# Patient Record
Sex: MALE | Race: WHITE | NOT HISPANIC OR LATINO | Employment: OTHER | URBAN - METROPOLITAN AREA
[De-identification: names, ages, dates, MRNs, and addresses within clinical notes are randomized per-mention and may not be internally consistent; named-entity substitution may affect disease eponyms.]

---

## 2024-03-21 ENCOUNTER — HOSPITAL ENCOUNTER (OUTPATIENT)
Dept: RADIOLOGY | Facility: CLINIC | Age: 59
Discharge: HOME | End: 2024-03-21
Payer: COMMERCIAL

## 2024-03-21 DIAGNOSIS — F17.210 NICOTINE DEPENDENCE, CIGARETTES, UNCOMPLICATED: ICD-10-CM

## 2024-03-21 PROCEDURE — 71271 CT THORAX LUNG CANCER SCR C-: CPT

## 2024-04-02 ENCOUNTER — TELEPHONE (OUTPATIENT)
Dept: CARDIOLOGY | Facility: CLINIC | Age: 59
End: 2024-04-02
Payer: COMMERCIAL

## 2024-04-02 NOTE — TELEPHONE ENCOUNTER
Talked with the Patient on 4/2/24 he will be a New Patient with Dr. Samson his appointment is 4/11/24 at 3pm I spoke with the patient on the phone

## 2024-04-05 PROBLEM — I10 BENIGN ESSENTIAL HYPERTENSION: Status: ACTIVE | Noted: 2021-02-26

## 2024-04-05 PROBLEM — E78.5 DYSLIPIDEMIA: Status: ACTIVE | Noted: 2019-05-23

## 2024-04-05 PROBLEM — J44.9 CHRONIC OBSTRUCTIVE LUNG DISEASE (MULTI): Status: ACTIVE | Noted: 2021-02-26

## 2024-04-05 RX ORDER — IBUPROFEN 600 MG/1
TABLET ORAL EVERY 8 HOURS
COMMUNITY
Start: 2024-03-28

## 2024-04-05 RX ORDER — FLUTICASONE FUROATE, UMECLIDINIUM BROMIDE AND VILANTEROL TRIFENATATE 200; 62.5; 25 UG/1; UG/1; UG/1
1 POWDER RESPIRATORY (INHALATION)
COMMUNITY
Start: 2024-03-28

## 2024-04-05 RX ORDER — LEVALBUTEROL TARTRATE 45 UG/1
AEROSOL, METERED ORAL
COMMUNITY
End: 2024-04-11 | Stop reason: WASHOUT

## 2024-04-05 RX ORDER — ATORVASTATIN CALCIUM 80 MG/1
1 TABLET, FILM COATED ORAL DAILY
COMMUNITY
Start: 2024-03-28

## 2024-04-05 RX ORDER — NAPROXEN 500 MG/1
TABLET ORAL
COMMUNITY
Start: 2008-10-07 | End: 2024-04-11 | Stop reason: WASHOUT

## 2024-04-05 RX ORDER — AMLODIPINE BESYLATE 5 MG/1
1 TABLET ORAL DAILY
COMMUNITY
Start: 2024-03-28

## 2024-04-05 RX ORDER — IPRATROPIUM BROMIDE AND ALBUTEROL 20; 100 UG/1; UG/1
SPRAY, METERED RESPIRATORY (INHALATION)
COMMUNITY
End: 2024-04-11 | Stop reason: WASHOUT

## 2024-04-05 RX ORDER — ONDANSETRON 4 MG/1
TABLET, FILM COATED ORAL
COMMUNITY

## 2024-04-05 RX ORDER — DOCUSATE SODIUM 100 MG/1
1 CAPSULE, LIQUID FILLED ORAL
COMMUNITY
End: 2024-04-11 | Stop reason: WASHOUT

## 2024-04-05 RX ORDER — GABAPENTIN 300 MG/1
300 CAPSULE ORAL DAILY
COMMUNITY
Start: 2018-06-12 | End: 2024-04-11 | Stop reason: WASHOUT

## 2024-04-05 RX ORDER — SIMVASTATIN 40 MG/1
TABLET, FILM COATED ORAL
COMMUNITY
Start: 2009-01-23 | End: 2024-04-11 | Stop reason: WASHOUT

## 2024-04-05 RX ORDER — LISINOPRIL 5 MG/1
5 TABLET ORAL DAILY
COMMUNITY
Start: 2023-09-09 | End: 2024-04-11 | Stop reason: WASHOUT

## 2024-04-05 RX ORDER — LISINOPRIL 10 MG/1
1 TABLET ORAL DAILY
COMMUNITY
Start: 2024-03-28

## 2024-04-05 RX ORDER — NALOXONE HYDROCHLORIDE 4 MG/.1ML
SPRAY NASAL
COMMUNITY
Start: 2023-11-14 | End: 2024-04-11 | Stop reason: WASHOUT

## 2024-04-05 RX ORDER — LIDOCAINE HCL 4 G/100G
CREAM TOPICAL
COMMUNITY
Start: 2017-02-17 | End: 2024-04-11 | Stop reason: WASHOUT

## 2024-04-05 RX ORDER — ATORVASTATIN CALCIUM 40 MG/1
40 TABLET, FILM COATED ORAL DAILY
COMMUNITY
End: 2024-04-11 | Stop reason: WASHOUT

## 2024-04-05 RX ORDER — ALBUTEROL SULFATE 90 UG/1
2 AEROSOL, METERED RESPIRATORY (INHALATION) EVERY 4 HOURS PRN
COMMUNITY

## 2024-04-05 RX ORDER — OXYCODONE AND ACETAMINOPHEN 7.5; 325 MG/1; MG/1
TABLET ORAL
COMMUNITY

## 2024-04-05 RX ORDER — PROMETHAZINE HYDROCHLORIDE 25 MG/1
25 TABLET ORAL
COMMUNITY
Start: 2018-01-15 | End: 2024-04-11 | Stop reason: WASHOUT

## 2024-04-05 RX ORDER — CYCLOBENZAPRINE HCL 5 MG
TABLET ORAL
COMMUNITY
End: 2024-04-11 | Stop reason: WASHOUT

## 2024-04-05 RX ORDER — SERTRALINE HYDROCHLORIDE 25 MG/1
100 TABLET, FILM COATED ORAL DAILY
COMMUNITY
End: 2024-04-11 | Stop reason: WASHOUT

## 2024-04-05 RX ORDER — QUETIAPINE FUMARATE 50 MG/1
50 TABLET, FILM COATED ORAL DAILY
COMMUNITY
End: 2024-04-11 | Stop reason: WASHOUT

## 2024-04-09 PROBLEM — F17.200 NICOTINE DEPENDENCE: Status: ACTIVE | Noted: 2024-04-09

## 2024-04-10 RX ORDER — ASPIRIN 81 MG/1
81 TABLET ORAL DAILY
COMMUNITY

## 2024-04-10 RX ORDER — BISMUTH SUBSALICYLATE 262 MG
1 TABLET,CHEWABLE ORAL DAILY
COMMUNITY

## 2024-04-11 ENCOUNTER — OFFICE VISIT (OUTPATIENT)
Dept: CARDIOLOGY | Facility: CLINIC | Age: 59
End: 2024-04-11
Payer: COMMERCIAL

## 2024-04-11 VITALS
SYSTOLIC BLOOD PRESSURE: 146 MMHG | WEIGHT: 213.6 LBS | BODY MASS INDEX: 29.9 KG/M2 | DIASTOLIC BLOOD PRESSURE: 84 MMHG | HEART RATE: 92 BPM | OXYGEN SATURATION: 96 % | HEIGHT: 71 IN

## 2024-04-11 DIAGNOSIS — F17.200 SMOKING: ICD-10-CM

## 2024-04-11 DIAGNOSIS — I25.84 CORONARY ARTERY CALCIFICATION: Primary | ICD-10-CM

## 2024-04-11 DIAGNOSIS — I25.10 CORONARY ARTERY CALCIFICATION: Primary | ICD-10-CM

## 2024-04-11 DIAGNOSIS — I10 BENIGN ESSENTIAL HYPERTENSION: ICD-10-CM

## 2024-04-11 DIAGNOSIS — E78.5 DYSLIPIDEMIA: ICD-10-CM

## 2024-04-11 PROCEDURE — 3079F DIAST BP 80-89 MM HG: CPT | Performed by: STUDENT IN AN ORGANIZED HEALTH CARE EDUCATION/TRAINING PROGRAM

## 2024-04-11 PROCEDURE — 99205 OFFICE O/P NEW HI 60 MIN: CPT | Performed by: STUDENT IN AN ORGANIZED HEALTH CARE EDUCATION/TRAINING PROGRAM

## 2024-04-11 PROCEDURE — 93000 ELECTROCARDIOGRAM COMPLETE: CPT | Performed by: STUDENT IN AN ORGANIZED HEALTH CARE EDUCATION/TRAINING PROGRAM

## 2024-04-11 PROCEDURE — 3077F SYST BP >= 140 MM HG: CPT | Performed by: STUDENT IN AN ORGANIZED HEALTH CARE EDUCATION/TRAINING PROGRAM

## 2024-04-11 NOTE — PROGRESS NOTES
[facility] and Vascular Cardiology Clinic Note    Date: 04/11/24  Time: 9:27 PM    Subjective   Linwood King IV is a 58 y.o. male who is coming to cardiology clinic to establish care. Patient has h/o   HTN, COPD, Smoking, HLD who is referred to cardiology due to abnormal coronary calcification noted on CT scan. This was found to routine screening for lung cancer/LDCT. Patient has no chest pain/angina. Has chronic dyspnea from COPD and continue to smoke. He denies any functional limitation.       Cardiovascular risk factors include: advanced age (older than 55 for men, 65 for women), dyslipidemia, hypertension, male gender, and smoking/ tobacco exposure.    Denies chest pain, has chronic dyspnea bcz of COPD  Smoking- yes, smokes half pack day for 40 yrs   ETOH- denies  Illicit Drugs-denies  FH- CHF    The ASCVD Risk score (Shruthi HANKS, et al., 2019) failed to calculate for the following reasons:    Cannot find a previous HDL lab    Cannot find a previous total cholesterol lab      Review of Systems:  Otherwise, limited cardiovascular review of systems is negative.        Medical History:   He has no past medical history on file.  Surgical History:   History reviewed. No pertinent surgical history.PSHP@  Social History:   Social Determinants of Health with Concerns     Tobacco Use: High Risk (4/11/2024)    Patient History     Smoking Tobacco Use: Every Day     Smokeless Tobacco Use: Never     Passive Exposure: Not on file   Alcohol Use: Not on file   Transportation Needs: Not on file   Physical Activity: Not on file   Stress: Not on file   Social Connections: Not on file   Intimate Partner Violence: Not on file   Depression: Not on file   Housing Stability: Not on file   Utilities: Not on file   Digital Equity: Not on file   Health Literacy: Not on file     Family History:   No family history on file.   Allergies:  Patient has no known allergies.    Outpatient Medications:  Current Outpatient Medications  "  Medication Instructions    albuterol 90 mcg/actuation inhaler 2 puffs, Every 4 hours PRN    aspirin 81 mg, oral, Daily    COQ10, UBIQUINOL, ORAL oral    ibuprofen 600 mg tablet oral, Every 8 hours    Lipitor 80 mg tablet 1 tablet, oral, Daily    lisinopril 10 mg tablet 1 tablet, oral, Daily    multivitamin tablet 1 tablet, oral, Daily    Norvasc 5 mg tablet 1 tablet, oral, Daily    ondansetron (Zofran) 4 mg tablet 1 tablet Orally every 12 hours as needed for nausea for 30 days    oxyCODONE-acetaminophen (Percocet) 7.5-325 mg tablet TAKE ONE (1) TABLET BY MOUTH EVERY SIX (6) HOURS AS NEEDED FOR PAIN    Trelegy Ellipta 200-62.5-25 mcg blister with device 1 puff, inhalation, Daily RT       Objective     Physical Exam  Vitals:    04/11/24 1511   BP: 146/84   BP Location: Left arm   Patient Position: Sitting   BP Cuff Size: Adult   Pulse: 92   SpO2: 96%   Weight: 96.9 kg (213 lb 9.6 oz)   Height: 1.803 m (5' 11\")     Wt Readings from Last 3 Encounters:   04/11/24 96.9 kg (213 lb 9.6 oz)       General: Alert and Oriented, No distress, cooperative  Head: Normocephalic without obvious abnormality, atraumatic  Eyes: Conjunctiva/corneas clear, EOM's grossly intact  Neck: Supple, trachea midline, No thyroid enlargement/tenderness/nodules; No JVD  Lungs: Clear to auscultation bilaterally, no wheezes, rhonci, or rales. respirations unlabored  Chest Wall: No tenderness or deformity  Heart: Regular rhythm, normal S1/S2, no murmur  Abdomen: Soft, non-tender, Non-distended, bowel sounds active  Extremities: No edema, no cyanosis, no clubbing  Skin: Skin color, texture, turgor normal.  No rashes or lesions noted  Neurologic: Alert and oriented x 3, grossly moving all extremities, speech intact        I have personally reviewed the following images and laboratory findings:  ECG: NSR, IRBB  Echocardiogram: not done  CT LUNG SCREENING LOW DOSE      INDICATION:  Signs/Symptoms:TOBACCO USE.      COMPARISON:  None.      ACCESSION " NUMBER(S):  [id number]      ORDERING CLINICIAN:  WILLIAMS VOGEL      TECHNIQUE:  Helical data acquisition of the chest was obtained without IV  contrast material.  Images were reformatted in axial, coronal, and  sagittal planes.      FINDINGS:  LUNGS AND AIRWAYS:  The trachea and central airways are patent. No endobronchial lesion  is seen. There is no focal consolidation, pleural effusion, or  pneumothorax.      Mild upper lung predominant emphysema noted.      Oval perifissural nodule in the right middle lobe on image 219  measuring 6 mm likely representing intrapulmonary lymph node. There  is a 7 mm right lower lobe perihilar nodule which may also represent  a lymph node.      MEDIASTINUM AND SAM, LOWER NECK AND AXILLA:  The visualized thyroid gland is within normal limits.  No evidence of thoracic lymphadenopathy by CT criteria.  Esophagus appears within normal limits as seen.      HEART AND VESSELS:  The thoracic aorta normal in course and caliber.  Main pulmonary artery and its branches are normal in caliber.      Patient's coronary artery calcification (CAC) score of 187 was  measured using an FDA-cleared AI tool. Study is not optimized for  evaluation of coronary arteries.      The cardiac chambers are not enlarged.  There is no pericardial effusion seen.      UPPER ABDOMEN:  The visualized subdiaphragmatic structures demonstrate no remarkable  findings.      CHEST WALL AND OSSEOUS STRUCTURES:  Chest wall is within normal limits.  No acute osseous pathology. There are no suspicious osseous lesions.  Findings of diffuse idiopathic skeletal hyperostosis(DISH) in the mid  and lower thoracic levels with anterior flowing ossification noted  involving greater than four levels.      IMPRESSION:  1. 7 mm right lower lobe perihilar nodule which may represent lymph  node, probably benign. Recommend six-month low-dose CT chest  follow-up to document stability.  2. Patient's coronary artery calcification (CAC) score of  "187 was  measured using an FDA-cleared AI tool that highly correlates with the  traditional CAC measurements. However, the above estimate may subject  to the limitations of the non-gated CT scan and the novel nature of  the algorithm. Therefore, AI-powered CAC score estimation  should not  be used as a replacement for traditional  cardiovascular risk factor  assessment. If further assistance is required, consider referral to  the [facility] Cardiovascular Prevention Program ( Preventive  Cardiology): Place EPIC referral to 'Cardiology Prevention Program.'  3. Mild upper lung predominant emphysema.  4. Findings of diffuse idiopathic skeletal hyperostosis(DISH) in the  mid and lower thoracic levels          Laboratory values:   No visits with results within 2 Month(s) from this visit.   Latest known visit with results is:   No results found for any previous visit.     CBC -  No results found for: \"WBC\", \"HGB\", \"HCT\", \"MCV\", \"PLT\"    CMP -  No results found for: \"CALCIUM\", \"PHOS\", \"PROT\", \"ALBUMIN\", \"AST\", \"ALT\", \"ALKPHOS\", \"BILITOT\"    LIPID PANEL -   No results found for: \"CHOL\", \"HDL\", \"CHHDL\", \"LDL\", \"VLDL\", \"TRIG\", \"NHDL\"    RENAL FUNCTION PANEL -   No results found for: \"GLU\", \"K\", \"CHLOR\", \"PHOS\"    No results found for: \"BNP\", \"HGBA1C\"     Assessment/Plan   -Abnormal coronary artery calcium score  -HTN  -HLD  -COPD  -Smoking     Plan:  -Discussed regarding risk factor modification and optimizing medical therapy given abnormal CAC score.  -No anginal symptoms at this time.   -Will get TTE to assess for structural hrt diz  -Will start on ASA 81 mg daily  -Continue statin therapy  -encourage smoking cessation.     RTC as scheduled     In addition, the following orders were placed today:  Orders Placed This Encounter   Procedures    ECG 12 Lead    Transthoracic Echo (TTE) Complete                 SIGNATURE: Ken Samson MD PATIENT NAME: Linwood King IV   DATE/TIME: April 11, 2024 9:27 PM MRN: [id number] "

## 2024-05-03 VITALS — HEIGHT: 71 IN | BODY MASS INDEX: 30.1 KG/M2 | WEIGHT: 215 LBS

## 2024-05-03 NOTE — ADDENDUM NOTE
Encounter addended by: Maverick Cooper on: 5/3/2024 9:38 AM   Actions taken: Imaging Exam ended, Flowsheet accepted

## 2024-05-06 ENCOUNTER — APPOINTMENT (OUTPATIENT)
Dept: CARDIOLOGY | Facility: HOSPITAL | Age: 59
End: 2024-05-06
Payer: COMMERCIAL

## 2024-09-23 ENCOUNTER — TELEPHONE (OUTPATIENT)
Dept: CARDIOLOGY | Facility: HOSPITAL | Age: 59
End: 2024-09-23
Payer: COMMERCIAL

## 2024-09-23 NOTE — TELEPHONE ENCOUNTER
Called and LVM to let the pt know that Hermelindo Batista PA marisa be out of town and I rescheduled his appt to 10/21 @1:30pm. I instructed him to call back if the time and date doesn't work.    Scout RAMIRES

## 2024-10-03 PROBLEM — J44.9 CHRONIC OBSTRUCTIVE PULMONARY DISEASE (MULTI): Status: ACTIVE | Noted: 2021-02-26

## 2024-10-03 PROBLEM — I25.10 CALCIFICATION OF CORONARY ARTERY: Status: ACTIVE | Noted: 2024-10-03

## 2024-10-03 RX ORDER — CYCLOBENZAPRINE HCL 5 MG
5 TABLET ORAL
COMMUNITY
Start: 2024-05-17

## 2024-10-14 ENCOUNTER — APPOINTMENT (OUTPATIENT)
Dept: CARDIOLOGY | Facility: CLINIC | Age: 59
End: 2024-10-14
Payer: COMMERCIAL

## 2024-10-21 ENCOUNTER — APPOINTMENT (OUTPATIENT)
Dept: CARDIOLOGY | Facility: HOSPITAL | Age: 59
End: 2024-10-21
Payer: COMMERCIAL

## 2024-10-21 VITALS
DIASTOLIC BLOOD PRESSURE: 66 MMHG | OXYGEN SATURATION: 95 % | WEIGHT: 207 LBS | HEIGHT: 71 IN | HEART RATE: 81 BPM | BODY MASS INDEX: 28.98 KG/M2 | SYSTOLIC BLOOD PRESSURE: 114 MMHG

## 2024-10-21 DIAGNOSIS — R94.31 ABNORMAL EKG: ICD-10-CM

## 2024-10-21 DIAGNOSIS — I10 BENIGN ESSENTIAL HYPERTENSION: Primary | ICD-10-CM

## 2024-10-21 DIAGNOSIS — I25.10 CALCIFICATION OF CORONARY ARTERY: ICD-10-CM

## 2024-10-21 DIAGNOSIS — E78.5 DYSLIPIDEMIA: ICD-10-CM

## 2024-10-21 DIAGNOSIS — I45.10 RBBB: ICD-10-CM

## 2024-10-21 PROCEDURE — 3078F DIAST BP <80 MM HG: CPT | Performed by: PHYSICIAN ASSISTANT

## 2024-10-21 PROCEDURE — 3074F SYST BP LT 130 MM HG: CPT | Performed by: PHYSICIAN ASSISTANT

## 2024-10-21 PROCEDURE — 99213 OFFICE O/P EST LOW 20 MIN: CPT | Performed by: PHYSICIAN ASSISTANT

## 2024-10-21 PROCEDURE — 3008F BODY MASS INDEX DOCD: CPT | Performed by: PHYSICIAN ASSISTANT

## 2024-10-21 ASSESSMENT — ENCOUNTER SYMPTOMS
ORTHOPNEA: 0
SHORTNESS OF BREATH: 1
FEVER: 0
ABDOMINAL PAIN: 0
WEAKNESS: 0
DYSURIA: 0
NAUSEA: 0
VOMITING: 0
WHEEZING: 0
DIARRHEA: 0
PALPITATIONS: 0

## 2024-10-21 NOTE — PATIENT INSTRUCTIONS
We will try to get the echo approved.  Please continue your current medications.  If you have any change in your cardiorespiratory status please call the office right away.  We will arrange for your yearly visit with your cardiologist.

## 2024-10-21 NOTE — PROGRESS NOTES
"Cardiology Follow Up  Chief Complaint:   Patient is here for 6 month office visit.      History Of Present Illness:    Linwood King IV is a 58 y.o. male who is coming to cardiology clinic to establish care. Patient has h/o   HTN, COPD, Smoking, HLD who is referred to cardiology due to abnormal coronary calcification noted on CT scan. This was found to routine screening for lung cancer/LDCT. Patient has no chest pain/angina. Has chronic dyspnea from COPD and continue to smoke. He denies any functional limitation.         Cardiovascular risk factors include: advanced age (older than 55 for men, 65 for women), dyslipidemia, hypertension, male gender, and smoking/ tobacco exposure.     Denies chest pain, has chronic dyspnea bcz of COPD  Smoking- yes, smokes half pack day for 40 yrs   ETOH- denies  Illicit Drugs-denies  FH- CHF     The ASCVD Risk score (Shruthi HANKS, et al., 2019) failed to calculate for the following reasons:    Cannot find a previous HDL lab    Cannot find a previous total cholesterol lab  10-21-24: This is a 59-year-old patient known to Dr. Samson.  Presents for 6-month follow-up.  In the last 6 months no interval change in cardiovascular status.  He was supposed to have an echocardiogram however care source would not approve the echocardiogram.  We will try to resubmit that with different diagnosis code of abnormal EKG/right bundle branch block and abnormal coronary calcium scoring.  Again he denies any chest pain.  He does have some degree of shortness of breath due to some underlying COPD.  He is recently started a Trelegy inhaler and feels that it is helping his breathing.  Unfortunately he continues to smoke.     Last Recorded Vitals:  Vitals:    10/21/24 1335   BP: 114/66   BP Location: Left arm   Patient Position: Sitting   BP Cuff Size: Adult   Pulse: 81   SpO2: 95%   Weight: 93.9 kg (207 lb)   Height: 1.803 m (5' 11\")       Past Medical History:  He has no past medical history on file.    Past " Surgical History:  He has no past surgical history on file.      Social History:  He reports that he has been smoking cigarettes. He has never used smokeless tobacco. No history on file for alcohol use and drug use.    Family History:  No family history on file.     Allergies:  Patient has no known allergies.    Outpatient Medications:  Current Outpatient Medications   Medication Instructions    albuterol 90 mcg/actuation inhaler 2 puffs, Every 4 hours PRN    aspirin 81 mg, Daily    COQ10, UBIQUINOL, ORAL Take by mouth.    cyclobenzaprine (FLEXERIL) 5 mg, oral    ibuprofen 600 mg tablet Every 8 hours    Lipitor 80 mg tablet 1 tablet, Daily    lisinopril 10 mg tablet 1 tablet, Daily    multivitamin tablet 1 tablet, Daily    Norvasc 5 mg tablet 1 tablet, Daily    ondansetron (Zofran) 4 mg tablet 1 tablet Orally every 12 hours as needed for nausea for 30 days    oxyCODONE-acetaminophen (Percocet) 7.5-325 mg tablet TAKE ONE (1) TABLET BY MOUTH EVERY SIX (6) HOURS AS NEEDED FOR PAIN    Trelegy Ellipta 200-62.5-25 mcg blister with device 1 puff, Daily RT     Review of Systems   Constitutional: Negative for fever and malaise/fatigue.   Cardiovascular:  Negative for chest pain, orthopnea and palpitations.   Respiratory:  Positive for shortness of breath. Negative for wheezing.         Some degree COPD   Skin:  Negative for itching and rash.   Gastrointestinal:  Negative for abdominal pain, diarrhea, nausea and vomiting.   Genitourinary:  Negative for dysuria.   Neurological:  Negative for weakness.      Physical Exam  Constitutional:       General: He is not in acute distress.  HENT:      Mouth/Throat:      Mouth: Mucous membranes are moist.   Cardiovascular:      Rate and Rhythm: Normal rate and regular rhythm.      Heart sounds: Normal heart sounds. No murmur heard.  Abdominal:      General: Abdomen is flat. Bowel sounds are normal.      Palpations: Abdomen is soft.   Musculoskeletal:         General: No swelling.  "  Skin:     General: Skin is warm and dry.   Neurological:      Mental Status: He is alert and oriented to person, place, and time.   Psychiatric:         Mood and Affect: Mood normal.           Last Labs:  CBC -  No results found for: \"WBC\", \"HGB\", \"HCT\", \"MCV\", \"PLT\"    CMP -  No results found for: \"CALCIUM\", \"PHOS\", \"PROT\", \"ALBUMIN\", \"AST\", \"ALT\", \"ALKPHOS\", \"BILITOT\"    LIPID PANEL -   No results found for: \"CHOL\", \"TRIG\", \"HDL\", \"CHHDL\", \"LDLF\", \"VLDL\", \"NHDL\"    RENAL FUNCTION PANEL -   No results found for: \"GLUCOSE\", \"NA\", \"K\", \"CL\", \"CO2\", \"ANIONGAP\", \"BUN\", \"CREATININE\", \"GFRMALE\", \"CALCIUM\", \"PHOS\", \"ALBUMIN\"     No results found for: \"BNP\", \"HGBA1C\"    Last Cardiology Tests:    Echo: ordered for 5/24--was never done--[facility] wouldn't approve      No recent results to review    Assessment/Plan   Problem List Items Addressed This Visit             ICD-10-CM       Cardiac and Vasculature    Benign essential hypertension - Primary I10    Dyslipidemia E78.5    Calcification of coronary artery I25.10    Relevant Orders    Transthoracic echo (TTE) complete     Other Visit Diagnoses         Codes    RBBB     I45.10    Relevant Orders    Transthoracic echo (TTE) complete    Abnormal EKG     R94.31    Relevant Orders    Transthoracic echo (TTE) complete        Abnormal EKG/right bundle branch block/abnormal coronary calcium scoring--fortunately not having any anginal symptoms or chest pain.  Dr. Samson wanted to get an echocardiogram just for size and structure of the heart.  We will resubmit the echo with these appropriate codes to see if care source will approve the echocardiogram.  For now the blood pressure is well-controlled we will continue current medical therapy.  Patient counseled regarding the benefits of smoking cessation.  Hypertension--again blood pressure is well-controlled and we will continue current meds.  COPD/tobacco abuse--again patient counseled regarding the benefits of smoking " cessation.  Overall patient is stable with no new cardiac complaints or concerns despite the abnormalities as noted above.  Again we will try to resubmit the echo to see if we get it approved just to make sure no issues with LV function.  Should the patient have any change in cardiovascular status he is instructed to contact the office otherwise we will arrange follow-up with Dr. Samson in 6 months.    Addendum: Regarding his echocardiogram I was asked to call for a peer to peer evaluation.  After discussing things with the medical director to make sure that we have in his note that based on his history of COPD he has been adequately treated and had some improvement with Trelegy however we want to make sure that he does not have any cardiac manifestations (considering RBBB/elevated coronary calcium score) adding to the shortness of breath and therefore the echocardiogram was ordered.  Will send this information to the medical director for further consideration of approval of his echocardiogram.      Sami Batista PA-C  10/21/2024  1:43 PM

## 2024-11-15 ENCOUNTER — APPOINTMENT (OUTPATIENT)
Dept: CARDIOLOGY | Facility: HOSPITAL | Age: 59
End: 2024-11-15
Payer: COMMERCIAL

## 2024-11-26 ENCOUNTER — HOSPITAL ENCOUNTER (OUTPATIENT)
Dept: CARDIOLOGY | Facility: HOSPITAL | Age: 59
Discharge: HOME | End: 2024-11-26
Payer: COMMERCIAL

## 2024-11-26 DIAGNOSIS — I25.10 CALCIFICATION OF CORONARY ARTERY: ICD-10-CM

## 2024-11-26 DIAGNOSIS — I45.10 RBBB: ICD-10-CM

## 2024-11-26 DIAGNOSIS — R94.31 ABNORMAL EKG: ICD-10-CM

## 2024-11-26 PROCEDURE — 2500000004 HC RX 250 GENERAL PHARMACY W/ HCPCS (ALT 636 FOR OP/ED): Performed by: PHYSICIAN ASSISTANT

## 2024-11-26 PROCEDURE — 93306 TTE W/DOPPLER COMPLETE: CPT | Performed by: INTERNAL MEDICINE

## 2024-11-26 PROCEDURE — C8929 TTE W OR WO FOL WCON,DOPPLER: HCPCS

## 2024-11-27 LAB
AORTIC VALVE MEAN GRADIENT: 4 MMHG
AORTIC VALVE PEAK VELOCITY: 1.23 M/S
AV PEAK GRADIENT: 6 MMHG
AVA (PEAK VEL): 2.23 CM2
AVA (VTI): 2.36 CM2
EJECTION FRACTION APICAL 4 CHAMBER: 63.1
EJECTION FRACTION: 59 %
LEFT ATRIUM VOLUME AREA LENGTH INDEX BSA: 20 ML/M2
LEFT VENTRICLE INTERNAL DIMENSION DIASTOLE: 4.4 CM (ref 3.5–6)
LEFT VENTRICULAR OUTFLOW TRACT DIAMETER: 2 CM
LV EJECTION FRACTION BIPLANE: 59 %
MITRAL VALVE E/A RATIO: 0.79
MITRAL VALVE E/E' RATIO: 6.6
TRICUSPID ANNULAR PLANE SYSTOLIC EXCURSION: 2.3 CM